# Patient Record
Sex: FEMALE | Race: WHITE | HISPANIC OR LATINO | Employment: OTHER | URBAN - METROPOLITAN AREA
[De-identification: names, ages, dates, MRNs, and addresses within clinical notes are randomized per-mention and may not be internally consistent; named-entity substitution may affect disease eponyms.]

---

## 2023-09-09 ENCOUNTER — HOSPITAL ENCOUNTER (INPATIENT)
Facility: MEDICAL CENTER | Age: 78
LOS: 2 days | DRG: 690 | End: 2023-09-11
Attending: EMERGENCY MEDICINE | Admitting: INTERNAL MEDICINE
Payer: MEDICARE

## 2023-09-09 ENCOUNTER — OFFICE VISIT (OUTPATIENT)
Dept: URGENT CARE | Facility: CLINIC | Age: 78
End: 2023-09-09
Payer: MEDICARE

## 2023-09-09 ENCOUNTER — APPOINTMENT (OUTPATIENT)
Dept: RADIOLOGY | Facility: MEDICAL CENTER | Age: 78
DRG: 690 | End: 2023-09-09
Attending: EMERGENCY MEDICINE
Payer: MEDICARE

## 2023-09-09 VITALS
WEIGHT: 107.5 LBS | SYSTOLIC BLOOD PRESSURE: 98 MMHG | RESPIRATION RATE: 16 BRPM | HEART RATE: 75 BPM | TEMPERATURE: 98.4 F | OXYGEN SATURATION: 96 % | HEIGHT: 59 IN | DIASTOLIC BLOOD PRESSURE: 54 MMHG | BODY MASS INDEX: 21.67 KG/M2

## 2023-09-09 DIAGNOSIS — R33.9 URINARY RETENTION: ICD-10-CM

## 2023-09-09 DIAGNOSIS — N39.0 ACUTE URINARY TRACT INFECTION: ICD-10-CM

## 2023-09-09 DIAGNOSIS — R10.30 LOWER ABDOMINAL PAIN: ICD-10-CM

## 2023-09-09 DIAGNOSIS — R33.9 INABILITY TO URINATE: ICD-10-CM

## 2023-09-09 DIAGNOSIS — E87.1 HYPONATREMIA: ICD-10-CM

## 2023-09-09 DIAGNOSIS — R11.0 NAUSEA: ICD-10-CM

## 2023-09-09 DIAGNOSIS — I95.9 HYPOTENSION, UNSPECIFIED HYPOTENSION TYPE: ICD-10-CM

## 2023-09-09 PROBLEM — Z87.19 HISTORY OF CIRRHOSIS OF LIVER: Status: ACTIVE | Noted: 2023-09-09

## 2023-09-09 PROBLEM — N12 PYELONEPHRITIS: Status: ACTIVE | Noted: 2023-09-09

## 2023-09-09 PROBLEM — R74.8 ELEVATED LIPASE: Status: ACTIVE | Noted: 2023-09-09

## 2023-09-09 PROBLEM — R33.8 ACUTE URINARY RETENTION: Status: ACTIVE | Noted: 2023-09-09

## 2023-09-09 PROBLEM — D50.9 IRON DEFICIENCY ANEMIA: Status: ACTIVE | Noted: 2023-09-09

## 2023-09-09 LAB
ALBUMIN SERPL BCP-MCNC: 4.2 G/DL (ref 3.2–4.9)
ALBUMIN/GLOB SERPL: 1.4 G/DL
ALP SERPL-CCNC: 92 U/L (ref 30–99)
ALT SERPL-CCNC: 25 U/L (ref 2–50)
ANION GAP SERPL CALC-SCNC: 11 MMOL/L (ref 7–16)
APPEARANCE UR: ABNORMAL
AST SERPL-CCNC: 25 U/L (ref 12–45)
BACTERIA #/AREA URNS HPF: ABNORMAL /HPF
BASOPHILS # BLD AUTO: 0 % (ref 0–1.8)
BASOPHILS # BLD: 0 K/UL (ref 0–0.12)
BILIRUB SERPL-MCNC: 0.5 MG/DL (ref 0.1–1.5)
BILIRUB UR QL STRIP.AUTO: NEGATIVE
BUN SERPL-MCNC: 16 MG/DL (ref 8–22)
CALCIUM ALBUM COR SERPL-MCNC: 9.3 MG/DL (ref 8.5–10.5)
CALCIUM SERPL-MCNC: 9.5 MG/DL (ref 8.5–10.5)
CHLORIDE SERPL-SCNC: 89 MMOL/L (ref 96–112)
CO2 SERPL-SCNC: 22 MMOL/L (ref 20–33)
COLOR UR: YELLOW
CREAT SERPL-MCNC: 0.89 MG/DL (ref 0.5–1.4)
EOSINOPHIL # BLD AUTO: 0.03 K/UL (ref 0–0.51)
EOSINOPHIL NFR BLD: 0.5 % (ref 0–6.9)
EPI CELLS #/AREA URNS HPF: ABNORMAL /HPF
ERYTHROCYTE [DISTWIDTH] IN BLOOD BY AUTOMATED COUNT: 39.9 FL (ref 35.9–50)
GFR SERPLBLD CREATININE-BSD FMLA CKD-EPI: 67 ML/MIN/1.73 M 2
GLOBULIN SER CALC-MCNC: 3.1 G/DL (ref 1.9–3.5)
GLUCOSE SERPL-MCNC: 97 MG/DL (ref 65–99)
GLUCOSE UR STRIP.AUTO-MCNC: NEGATIVE MG/DL
HCT VFR BLD AUTO: 30.6 % (ref 37–47)
HGB BLD-MCNC: 10.6 G/DL (ref 12–16)
IMM GRANULOCYTES # BLD AUTO: 0.01 K/UL (ref 0–0.11)
IMM GRANULOCYTES NFR BLD AUTO: 0.2 % (ref 0–0.9)
KETONES UR STRIP.AUTO-MCNC: NEGATIVE MG/DL
LEUKOCYTE ESTERASE UR QL STRIP.AUTO: ABNORMAL
LIPASE SERPL-CCNC: 138 U/L (ref 11–82)
LYMPHOCYTES # BLD AUTO: 0.48 K/UL (ref 1–4.8)
LYMPHOCYTES NFR BLD: 7.8 % (ref 22–41)
MCH RBC QN AUTO: 29.5 PG (ref 27–33)
MCHC RBC AUTO-ENTMCNC: 34.6 G/DL (ref 32.2–35.5)
MCV RBC AUTO: 85.2 FL (ref 81.4–97.8)
MICRO URNS: ABNORMAL
MONOCYTES # BLD AUTO: 0.87 K/UL (ref 0–0.85)
MONOCYTES NFR BLD AUTO: 14.1 % (ref 0–13.4)
NEUTROPHILS # BLD AUTO: 4.76 K/UL (ref 1.82–7.42)
NEUTROPHILS NFR BLD: 77.4 % (ref 44–72)
NITRITE UR QL STRIP.AUTO: POSITIVE
NRBC # BLD AUTO: 0 K/UL
NRBC BLD-RTO: 0 /100 WBC (ref 0–0.2)
OSMOLALITY SERPL: 261 MOSM/KG H2O (ref 278–298)
PH UR STRIP.AUTO: 6 [PH] (ref 5–8)
PLATELET # BLD AUTO: 160 K/UL (ref 164–446)
PMV BLD AUTO: 9.2 FL (ref 9–12.9)
POTASSIUM SERPL-SCNC: 4.4 MMOL/L (ref 3.6–5.5)
PROT SERPL-MCNC: 7.3 G/DL (ref 6–8.2)
PROT UR QL STRIP: NEGATIVE MG/DL
RBC # BLD AUTO: 3.59 M/UL (ref 4.2–5.4)
RBC # URNS HPF: ABNORMAL /HPF
RBC UR QL AUTO: NEGATIVE
RENAL EPI CELLS #/AREA URNS HPF: ABNORMAL /HPF
SODIUM SERPL-SCNC: 122 MMOL/L (ref 135–145)
SP GR UR STRIP.AUTO: 1.01
TSH SERPL DL<=0.005 MIU/L-ACNC: 1.48 UIU/ML (ref 0.38–5.33)
UROBILINOGEN UR STRIP.AUTO-MCNC: 1 MG/DL
WBC # BLD AUTO: 6.2 K/UL (ref 4.8–10.8)
WBC #/AREA URNS HPF: ABNORMAL /HPF

## 2023-09-09 PROCEDURE — 3078F DIAST BP <80 MM HG: CPT

## 2023-09-09 PROCEDURE — 80053 COMPREHEN METABOLIC PANEL: CPT

## 2023-09-09 PROCEDURE — 3074F SYST BP LT 130 MM HG: CPT

## 2023-09-09 PROCEDURE — 81001 URINALYSIS AUTO W/SCOPE: CPT

## 2023-09-09 PROCEDURE — 700111 HCHG RX REV CODE 636 W/ 250 OVERRIDE (IP): Mod: JZ | Performed by: EMERGENCY MEDICINE

## 2023-09-09 PROCEDURE — 83690 ASSAY OF LIPASE: CPT

## 2023-09-09 PROCEDURE — 74177 CT ABD & PELVIS W/CONTRAST: CPT

## 2023-09-09 PROCEDURE — 700102 HCHG RX REV CODE 250 W/ 637 OVERRIDE(OP): Performed by: INTERNAL MEDICINE

## 2023-09-09 PROCEDURE — 96374 THER/PROPH/DIAG INJ IV PUSH: CPT

## 2023-09-09 PROCEDURE — A9270 NON-COVERED ITEM OR SERVICE: HCPCS | Performed by: INTERNAL MEDICINE

## 2023-09-09 PROCEDURE — 99222 1ST HOSP IP/OBS MODERATE 55: CPT | Mod: AI | Performed by: INTERNAL MEDICINE

## 2023-09-09 PROCEDURE — 36415 COLL VENOUS BLD VENIPUNCTURE: CPT

## 2023-09-09 PROCEDURE — 700117 HCHG RX CONTRAST REV CODE 255: Performed by: EMERGENCY MEDICINE

## 2023-09-09 PROCEDURE — 700111 HCHG RX REV CODE 636 W/ 250 OVERRIDE (IP): Mod: JZ | Performed by: INTERNAL MEDICINE

## 2023-09-09 PROCEDURE — 99205 OFFICE O/P NEW HI 60 MIN: CPT

## 2023-09-09 PROCEDURE — 99285 EMERGENCY DEPT VISIT HI MDM: CPT

## 2023-09-09 PROCEDURE — 83930 ASSAY OF BLOOD OSMOLALITY: CPT

## 2023-09-09 PROCEDURE — 96375 TX/PRO/DX INJ NEW DRUG ADDON: CPT

## 2023-09-09 PROCEDURE — 700105 HCHG RX REV CODE 258: Performed by: EMERGENCY MEDICINE

## 2023-09-09 PROCEDURE — 770006 HCHG ROOM/CARE - MED/SURG/GYN SEMI*

## 2023-09-09 PROCEDURE — 85025 COMPLETE CBC W/AUTO DIFF WBC: CPT

## 2023-09-09 PROCEDURE — 84443 ASSAY THYROID STIM HORMONE: CPT

## 2023-09-09 PROCEDURE — 700105 HCHG RX REV CODE 258: Performed by: INTERNAL MEDICINE

## 2023-09-09 RX ORDER — OXYCODONE HYDROCHLORIDE 5 MG/1
5 TABLET ORAL
Status: DISCONTINUED | OUTPATIENT
Start: 2023-09-09 | End: 2023-09-11 | Stop reason: HOSPADM

## 2023-09-09 RX ORDER — OXYCODONE HYDROCHLORIDE 5 MG/1
2.5 TABLET ORAL
Status: DISCONTINUED | OUTPATIENT
Start: 2023-09-09 | End: 2023-09-11 | Stop reason: HOSPADM

## 2023-09-09 RX ORDER — LOSARTAN POTASSIUM 50 MG/1
TABLET ORAL
COMMUNITY
Start: 2023-08-19 | End: 2023-09-09

## 2023-09-09 RX ORDER — GABAPENTIN 300 MG/1
300 CAPSULE ORAL
Status: DISCONTINUED | OUTPATIENT
Start: 2023-09-09 | End: 2023-09-11 | Stop reason: HOSPADM

## 2023-09-09 RX ORDER — HYDRALAZINE HYDROCHLORIDE 50 MG/1
25 TABLET, FILM COATED ORAL 2 TIMES DAILY
Status: DISCONTINUED | OUTPATIENT
Start: 2023-09-09 | End: 2023-09-11 | Stop reason: HOSPADM

## 2023-09-09 RX ORDER — HYDRALAZINE HYDROCHLORIDE 25 MG/1
25 TABLET, FILM COATED ORAL 2 TIMES DAILY
COMMUNITY
Start: 2023-07-13 | End: 2023-09-09

## 2023-09-09 RX ORDER — IBUPROFEN 200 MG
400 TABLET ORAL EVERY 6 HOURS PRN
COMMUNITY

## 2023-09-09 RX ORDER — MORPHINE SULFATE 4 MG/ML
4 INJECTION INTRAVENOUS ONCE
Status: COMPLETED | OUTPATIENT
Start: 2023-09-09 | End: 2023-09-09

## 2023-09-09 RX ORDER — AMOXICILLIN 250 MG
2 CAPSULE ORAL 2 TIMES DAILY
Status: DISCONTINUED | OUTPATIENT
Start: 2023-09-09 | End: 2023-09-11 | Stop reason: HOSPADM

## 2023-09-09 RX ORDER — CEFTRIAXONE 1 G/1
1000 INJECTION, POWDER, FOR SOLUTION INTRAMUSCULAR; INTRAVENOUS ONCE
Status: COMPLETED | OUTPATIENT
Start: 2023-09-09 | End: 2023-09-09

## 2023-09-09 RX ORDER — HYDRALAZINE HYDROCHLORIDE 25 MG/1
25 TABLET, FILM COATED ORAL 2 TIMES DAILY
Status: ON HOLD | COMMUNITY
End: 2023-09-11

## 2023-09-09 RX ORDER — PREDNISONE 50 MG/1
TABLET ORAL
COMMUNITY
Start: 2023-07-27 | End: 2023-09-09

## 2023-09-09 RX ORDER — LOSARTAN POTASSIUM 50 MG/1
100 TABLET ORAL
Status: DISCONTINUED | OUTPATIENT
Start: 2023-09-10 | End: 2023-09-11 | Stop reason: HOSPADM

## 2023-09-09 RX ORDER — ENOXAPARIN SODIUM 100 MG/ML
40 INJECTION SUBCUTANEOUS DAILY
Status: DISCONTINUED | OUTPATIENT
Start: 2023-09-09 | End: 2023-09-11 | Stop reason: HOSPADM

## 2023-09-09 RX ORDER — HYDRALAZINE HYDROCHLORIDE 25 MG/1
25 TABLET, FILM COATED ORAL
COMMUNITY
End: 2023-09-09

## 2023-09-09 RX ORDER — PROPRANOLOL HYDROCHLORIDE 10 MG/1
10 TABLET ORAL 3 TIMES DAILY
COMMUNITY
Start: 2023-08-10 | End: 2023-09-09

## 2023-09-09 RX ORDER — HYDROMORPHONE HYDROCHLORIDE 1 MG/ML
0.25 INJECTION, SOLUTION INTRAMUSCULAR; INTRAVENOUS; SUBCUTANEOUS
Status: DISCONTINUED | OUTPATIENT
Start: 2023-09-09 | End: 2023-09-11 | Stop reason: HOSPADM

## 2023-09-09 RX ORDER — PROPRANOLOL HYDROCHLORIDE 10 MG/1
10 TABLET ORAL
COMMUNITY
End: 2023-09-09

## 2023-09-09 RX ORDER — ANTIOX #8/OM3/DHA/EPA/LUT/ZEAX 250-2.5 MG
CAPSULE ORAL
COMMUNITY
End: 2023-09-09

## 2023-09-09 RX ORDER — FERROUS SULFATE 324(65)MG
324 TABLET, DELAYED RELEASE (ENTERIC COATED) ORAL
COMMUNITY
End: 2023-09-09

## 2023-09-09 RX ORDER — GABAPENTIN 300 MG/1
300 CAPSULE ORAL
COMMUNITY

## 2023-09-09 RX ORDER — SPIRONOLACTONE 50 MG/1
50 TABLET, FILM COATED ORAL DAILY
Status: ON HOLD | COMMUNITY
End: 2023-09-11

## 2023-09-09 RX ORDER — SPIRONOLACTONE 50 MG/1
50 TABLET, FILM COATED ORAL DAILY
COMMUNITY
End: 2023-09-09

## 2023-09-09 RX ORDER — ONDANSETRON 2 MG/ML
4 INJECTION INTRAMUSCULAR; INTRAVENOUS EVERY 4 HOURS PRN
Status: DISCONTINUED | OUTPATIENT
Start: 2023-09-09 | End: 2023-09-11 | Stop reason: HOSPADM

## 2023-09-09 RX ORDER — TORSEMIDE 5 MG/1
5 TABLET ORAL DAILY
COMMUNITY
End: 2023-09-09

## 2023-09-09 RX ORDER — LOSARTAN POTASSIUM 100 MG/1
100 TABLET ORAL DAILY
COMMUNITY
End: 2023-09-09

## 2023-09-09 RX ORDER — GABAPENTIN 300 MG/1
300 CAPSULE ORAL
COMMUNITY
Start: 2023-08-22 | End: 2023-09-09

## 2023-09-09 RX ORDER — BISACODYL 10 MG
10 SUPPOSITORY, RECTAL RECTAL
Status: DISCONTINUED | OUTPATIENT
Start: 2023-09-09 | End: 2023-09-11 | Stop reason: HOSPADM

## 2023-09-09 RX ORDER — SODIUM CHLORIDE 9 MG/ML
500 INJECTION, SOLUTION INTRAVENOUS ONCE
Status: COMPLETED | OUTPATIENT
Start: 2023-09-09 | End: 2023-09-09

## 2023-09-09 RX ORDER — PROPRANOLOL HYDROCHLORIDE 10 MG/1
10 TABLET ORAL 3 TIMES DAILY
COMMUNITY

## 2023-09-09 RX ORDER — POLYETHYLENE GLYCOL 3350 17 G/17G
1 POWDER, FOR SOLUTION ORAL
Status: DISCONTINUED | OUTPATIENT
Start: 2023-09-09 | End: 2023-09-11 | Stop reason: HOSPADM

## 2023-09-09 RX ORDER — IBUPROFEN 200 MG
400 TABLET ORAL 4 TIMES DAILY PRN
Status: DISCONTINUED | OUTPATIENT
Start: 2023-09-09 | End: 2023-09-09

## 2023-09-09 RX ORDER — VALACYCLOVIR HYDROCHLORIDE 500 MG/1
TABLET, FILM COATED ORAL
COMMUNITY
Start: 2023-07-27 | End: 2023-09-09

## 2023-09-09 RX ORDER — FERROUS GLUCONATE 324(38)MG
TABLET ORAL
COMMUNITY
Start: 2023-08-19 | End: 2023-09-09

## 2023-09-09 RX ORDER — SODIUM CHLORIDE 9 MG/ML
INJECTION, SOLUTION INTRAVENOUS CONTINUOUS
Status: DISCONTINUED | OUTPATIENT
Start: 2023-09-09 | End: 2023-09-10

## 2023-09-09 RX ORDER — LOSARTAN POTASSIUM 100 MG/1
100 TABLET ORAL DAILY
Status: ON HOLD | COMMUNITY
End: 2023-09-11

## 2023-09-09 RX ORDER — TAMSULOSIN HYDROCHLORIDE 0.4 MG/1
0.4 CAPSULE ORAL
Status: DISCONTINUED | OUTPATIENT
Start: 2023-09-09 | End: 2023-09-11 | Stop reason: HOSPADM

## 2023-09-09 RX ORDER — TORSEMIDE 5 MG/1
TABLET ORAL
COMMUNITY
Start: 2023-07-13 | End: 2023-09-09

## 2023-09-09 RX ORDER — IBUPROFEN 400 MG/1
TABLET ORAL
COMMUNITY
Start: 2023-07-27

## 2023-09-09 RX ADMIN — HYDRALAZINE HYDROCHLORIDE 25 MG: 50 TABLET, FILM COATED ORAL at 23:22

## 2023-09-09 RX ADMIN — MORPHINE SULFATE 4 MG: 4 INJECTION, SOLUTION INTRAMUSCULAR; INTRAVENOUS at 19:43

## 2023-09-09 RX ADMIN — SODIUM CHLORIDE: 9 INJECTION, SOLUTION INTRAVENOUS at 22:26

## 2023-09-09 RX ADMIN — SODIUM CHLORIDE 500 ML: 9 INJECTION, SOLUTION INTRAVENOUS at 19:35

## 2023-09-09 RX ADMIN — CEFTRIAXONE SODIUM 1000 MG: 1 INJECTION, POWDER, FOR SOLUTION INTRAMUSCULAR; INTRAVENOUS at 19:42

## 2023-09-09 RX ADMIN — IOHEXOL 100 ML: 350 INJECTION, SOLUTION INTRAVENOUS at 20:00

## 2023-09-09 RX ADMIN — GABAPENTIN 300 MG: 300 CAPSULE ORAL at 23:22

## 2023-09-09 RX ADMIN — OXYCODONE HYDROCHLORIDE 2.5 MG: 5 TABLET ORAL at 22:01

## 2023-09-09 RX ADMIN — TAMSULOSIN HYDROCHLORIDE 0.4 MG: 0.4 CAPSULE ORAL at 23:22

## 2023-09-09 RX ADMIN — ENOXAPARIN SODIUM 40 MG: 100 INJECTION SUBCUTANEOUS at 23:21

## 2023-09-09 ASSESSMENT — PAIN DESCRIPTION - PAIN TYPE
TYPE: ACUTE PAIN

## 2023-09-09 ASSESSMENT — COGNITIVE AND FUNCTIONAL STATUS - GENERAL
SUGGESTED CMS G CODE MODIFIER MOBILITY: CH
MOBILITY SCORE: 24
DAILY ACTIVITIY SCORE: 24
SUGGESTED CMS G CODE MODIFIER DAILY ACTIVITY: CH

## 2023-09-09 ASSESSMENT — ENCOUNTER SYMPTOMS
NECK PAIN: 0
PHOTOPHOBIA: 0
PALPITATIONS: 0
CHILLS: 0
DOUBLE VISION: 0
NAUSEA: 1
CONSTIPATION: 0
VOMITING: 0
TREMORS: 0
WEIGHT LOSS: 0
BRUISES/BLEEDS EASILY: 0
SPUTUM PRODUCTION: 0
ORTHOPNEA: 0
COUGH: 0
FOCAL WEAKNESS: 0
SPEECH CHANGE: 0
HEARTBURN: 0
DIARRHEA: 0
BACK PAIN: 0
ABDOMINAL PAIN: 1
HEADACHES: 0
FLANK PAIN: 0
POLYDIPSIA: 0
HEMOPTYSIS: 0
BLURRED VISION: 0
FEVER: 0
NERVOUS/ANXIOUS: 0
HALLUCINATIONS: 0

## 2023-09-09 ASSESSMENT — LIFESTYLE VARIABLES
DOES PATIENT WANT TO STOP DRINKING: NO
TOTAL SCORE: 0
CONSUMPTION TOTAL: NEGATIVE
HAVE YOU EVER FELT YOU SHOULD CUT DOWN ON YOUR DRINKING: NO
EVER FELT BAD OR GUILTY ABOUT YOUR DRINKING: NO
TOTAL SCORE: 0
ALCOHOL_USE: YES
SUBSTANCE_ABUSE: 0
HOW MANY TIMES IN THE PAST YEAR HAVE YOU HAD 5 OR MORE DRINKS IN A DAY: 0
TOTAL SCORE: 0
ON A TYPICAL DAY WHEN YOU DRINK ALCOHOL HOW MANY DRINKS DO YOU HAVE: 1
EVER HAD A DRINK FIRST THING IN THE MORNING TO STEADY YOUR NERVES TO GET RID OF A HANGOVER: NO
HAVE PEOPLE ANNOYED YOU BY CRITICIZING YOUR DRINKING: NO
AVERAGE NUMBER OF DAYS PER WEEK YOU HAVE A DRINK CONTAINING ALCOHOL: 1

## 2023-09-09 ASSESSMENT — PATIENT HEALTH QUESTIONNAIRE - PHQ9
SUM OF ALL RESPONSES TO PHQ9 QUESTIONS 1 AND 2: 0
2. FEELING DOWN, DEPRESSED, IRRITABLE, OR HOPELESS: NOT AT ALL
1. LITTLE INTEREST OR PLEASURE IN DOING THINGS: NOT AT ALL

## 2023-09-09 NOTE — PROGRESS NOTES
"Chief Complaint   Patient presents with    Abdominal Pain     X3days Lower abdomen pain/pressure/dark urine/flank pain         Subjective:   HISTORY OF PRESENT ILLNESS: Patti Cali is a 78 y.o. female who presents for bilateral lower abd pain x 3 days. Painstarted in the RLQ and then moved to both sides by Friday.  She describes the pain as stabbing and constant.  Reports last normal BM was Thursday, states she is usually pretty regular.  She has had little to no appetite and has not been drinking water.  Reports  a watery BM this morning  Denies fevers, dysuria but reports her urine is dark, she does have some bilateral flank pain.  She had an EGD 2 days ago, reports she tolerated the procedure well, She has anemia secondary to chronic blood loss and alcoholic cirrhosis of the liver.      She has not voided since this morning and is unable to urinate in the clinic today      Medications, Allergies, current problem list, Social and Family history reviewed today in Epic.     Objective:     BP 98/54 (BP Location: Left arm, Patient Position: Sitting)   Pulse 75   Temp 36.9 °C (98.4 °F) (Temporal)   Resp 16   Ht 1.499 m (4' 11\")   Wt 48.8 kg (107 lb 8 oz)   SpO2 96%     Physical Exam  Vitals reviewed.   Constitutional:       Appearance: Normal appearance.   HENT:      Mouth/Throat:      Mouth: Mucous membranes are moist.   Cardiovascular:      Rate and Rhythm: Normal rate.      Heart sounds: Normal heart sounds.   Pulmonary:      Effort: Pulmonary effort is normal. No tachypnea.      Breath sounds: Normal breath sounds.   Abdominal:      General: Bowel sounds are normal. There is distension.      Tenderness: There is abdominal tenderness in the right lower quadrant and left lower quadrant. There is guarding.      Hernia: No hernia is present.      Comments: She is mildly distended in the lower abdomen and guarding on palpation.  TTP throughout lower abd    Neg Bond sign, no tenderness over Mcburney's " point    Suprapubic discomfort.  She has been unable to provide us with a urine sample.  She lasted voided early this morning.  Bowel sounds are normal.     No CVA tenderness   Skin:     General: Skin is warm and dry.   Neurological:      Mental Status: She is alert and oriented to person, place, and time.   Psychiatric:         Mood and Affect: Mood normal.          Assessment/Plan:     Diagnosis and associated orders    I personally reviewed prior external notes and test results pertinent to today's visit.     1. Lower abdominal pain        2. Inability to urinate        3. Nausea                  IMPRESSION: Patient is non-toxic appearing with lower abdominal pain.  She did just have a GI procedure and is unable to urinate.  She is distended and has guarding and pain on abd exam, for these reasons I feel she needs further imaging emergently or a catheter if she is unable to void.  I have instructed her to head to the ER for further evaluation.  Vital signs are stable and she is able to go by PV with her daughter    Educated on red flag symptoms and Instructed patient to return to Urgent Care or nearest Emergency Department if symptoms fail to improve, for any change in condition, further concerns, or new concerning symptoms. Patient states understanding of the plan of care and discharge instructions.  They are discharged in stable condition.         Please note that this dictation was created using voice recognition software. I have made a reasonable attempt to correct obvious errors, but I expect that there are errors of grammar and possibly content that I did not discover before finalizing the note.    This note was electronically signed by KARINE De Santiago

## 2023-09-09 NOTE — ED TRIAGE NOTES
Chief Complaint   Patient presents with    Abdominal Pain     R sided pain since Wednesday and L sided since Friday.      Pt ambulatory to triage for above complaint. Pt reports she has not been wanting to eat or drink since Wednesday. Pt reports urinating this morning and it was dark in color, denies burning sensation.

## 2023-09-10 PROBLEM — I95.9 HYPOTENSION: Status: ACTIVE | Noted: 2023-09-10

## 2023-09-10 PROBLEM — Z71.89 ACP (ADVANCE CARE PLANNING): Status: ACTIVE | Noted: 2023-09-10

## 2023-09-10 LAB
ALBUMIN SERPL BCP-MCNC: 3.3 G/DL (ref 3.2–4.9)
ALBUMIN/GLOB SERPL: 1.2 G/DL
ALP SERPL-CCNC: 72 U/L (ref 30–99)
ALT SERPL-CCNC: 17 U/L (ref 2–50)
ANION GAP SERPL CALC-SCNC: 10 MMOL/L (ref 7–16)
ANION GAP SERPL CALC-SCNC: 9 MMOL/L (ref 7–16)
AST SERPL-CCNC: 18 U/L (ref 12–45)
BASOPHILS # BLD AUTO: 0.2 % (ref 0–1.8)
BASOPHILS # BLD: 0.01 K/UL (ref 0–0.12)
BILIRUB SERPL-MCNC: 0.2 MG/DL (ref 0.1–1.5)
BUN SERPL-MCNC: 10 MG/DL (ref 8–22)
BUN SERPL-MCNC: 12 MG/DL (ref 8–22)
C DIFF DNA SPEC QL NAA+PROBE: NEGATIVE
C DIFF TOX GENS STL QL NAA+PROBE: NEGATIVE
CALCIUM ALBUM COR SERPL-MCNC: 9.2 MG/DL (ref 8.5–10.5)
CALCIUM SERPL-MCNC: 8.4 MG/DL (ref 8.5–10.5)
CALCIUM SERPL-MCNC: 8.6 MG/DL (ref 8.5–10.5)
CHLORIDE SERPL-SCNC: 98 MMOL/L (ref 96–112)
CHLORIDE SERPL-SCNC: 99 MMOL/L (ref 96–112)
CO2 SERPL-SCNC: 21 MMOL/L (ref 20–33)
CO2 SERPL-SCNC: 22 MMOL/L (ref 20–33)
CREAT SERPL-MCNC: 0.59 MG/DL (ref 0.5–1.4)
CREAT SERPL-MCNC: 0.64 MG/DL (ref 0.5–1.4)
EOSINOPHIL # BLD AUTO: 0.03 K/UL (ref 0–0.51)
EOSINOPHIL NFR BLD: 0.7 % (ref 0–6.9)
ERYTHROCYTE [DISTWIDTH] IN BLOOD BY AUTOMATED COUNT: 41.1 FL (ref 35.9–50)
GFR SERPLBLD CREATININE-BSD FMLA CKD-EPI: 92 ML/MIN/1.73 M 2
GFR SERPLBLD CREATININE-BSD FMLA CKD-EPI: 93 ML/MIN/1.73 M 2
GLOBULIN SER CALC-MCNC: 2.7 G/DL (ref 1.9–3.5)
GLUCOSE SERPL-MCNC: 87 MG/DL (ref 65–99)
GLUCOSE SERPL-MCNC: 87 MG/DL (ref 65–99)
HCT VFR BLD AUTO: 28 % (ref 37–47)
HGB BLD-MCNC: 9.3 G/DL (ref 12–16)
IMM GRANULOCYTES # BLD AUTO: 0.01 K/UL (ref 0–0.11)
IMM GRANULOCYTES NFR BLD AUTO: 0.2 % (ref 0–0.9)
LYMPHOCYTES # BLD AUTO: 0.66 K/UL (ref 1–4.8)
LYMPHOCYTES NFR BLD: 15.4 % (ref 22–41)
MAGNESIUM SERPL-MCNC: 1.9 MG/DL (ref 1.5–2.5)
MCH RBC QN AUTO: 29.2 PG (ref 27–33)
MCHC RBC AUTO-ENTMCNC: 33.2 G/DL (ref 32.2–35.5)
MCV RBC AUTO: 87.8 FL (ref 81.4–97.8)
MONOCYTES # BLD AUTO: 0.73 K/UL (ref 0–0.85)
MONOCYTES NFR BLD AUTO: 17 % (ref 0–13.4)
NEUTROPHILS # BLD AUTO: 2.85 K/UL (ref 1.82–7.42)
NEUTROPHILS NFR BLD: 66.5 % (ref 44–72)
NRBC # BLD AUTO: 0 K/UL
NRBC BLD-RTO: 0 /100 WBC (ref 0–0.2)
OSMOLALITY UR: 203 MOSM/KG H2O (ref 300–900)
PHOSPHATE SERPL-MCNC: 3 MG/DL (ref 2.5–4.5)
PLATELET # BLD AUTO: 136 K/UL (ref 164–446)
PMV BLD AUTO: 9.1 FL (ref 9–12.9)
POTASSIUM SERPL-SCNC: 3.8 MMOL/L (ref 3.6–5.5)
POTASSIUM SERPL-SCNC: 3.9 MMOL/L (ref 3.6–5.5)
PROT SERPL-MCNC: 6 G/DL (ref 6–8.2)
RBC # BLD AUTO: 3.19 M/UL (ref 4.2–5.4)
SODIUM SERPL-SCNC: 128 MMOL/L (ref 135–145)
SODIUM SERPL-SCNC: 129 MMOL/L (ref 135–145)
SODIUM SERPL-SCNC: 130 MMOL/L (ref 135–145)
SODIUM SERPL-SCNC: 131 MMOL/L (ref 135–145)
SODIUM UR-SCNC: <20 MMOL/L
WBC # BLD AUTO: 4.3 K/UL (ref 4.8–10.8)

## 2023-09-10 PROCEDURE — 36415 COLL VENOUS BLD VENIPUNCTURE: CPT

## 2023-09-10 PROCEDURE — 83735 ASSAY OF MAGNESIUM: CPT

## 2023-09-10 PROCEDURE — 700105 HCHG RX REV CODE 258: Performed by: STUDENT IN AN ORGANIZED HEALTH CARE EDUCATION/TRAINING PROGRAM

## 2023-09-10 PROCEDURE — 700102 HCHG RX REV CODE 250 W/ 637 OVERRIDE(OP): Performed by: INTERNAL MEDICINE

## 2023-09-10 PROCEDURE — 84100 ASSAY OF PHOSPHORUS: CPT

## 2023-09-10 PROCEDURE — 80053 COMPREHEN METABOLIC PANEL: CPT

## 2023-09-10 PROCEDURE — 83935 ASSAY OF URINE OSMOLALITY: CPT

## 2023-09-10 PROCEDURE — 770006 HCHG ROOM/CARE - MED/SURG/GYN SEMI*

## 2023-09-10 PROCEDURE — 87493 C DIFF AMPLIFIED PROBE: CPT

## 2023-09-10 PROCEDURE — 700101 HCHG RX REV CODE 250: Performed by: INTERNAL MEDICINE

## 2023-09-10 PROCEDURE — 84300 ASSAY OF URINE SODIUM: CPT

## 2023-09-10 PROCEDURE — A9270 NON-COVERED ITEM OR SERVICE: HCPCS | Performed by: STUDENT IN AN ORGANIZED HEALTH CARE EDUCATION/TRAINING PROGRAM

## 2023-09-10 PROCEDURE — 700111 HCHG RX REV CODE 636 W/ 250 OVERRIDE (IP): Performed by: INTERNAL MEDICINE

## 2023-09-10 PROCEDURE — 85025 COMPLETE CBC W/AUTO DIFF WBC: CPT

## 2023-09-10 PROCEDURE — 87086 URINE CULTURE/COLONY COUNT: CPT

## 2023-09-10 PROCEDURE — 700102 HCHG RX REV CODE 250 W/ 637 OVERRIDE(OP): Performed by: STUDENT IN AN ORGANIZED HEALTH CARE EDUCATION/TRAINING PROGRAM

## 2023-09-10 PROCEDURE — 99497 ADVNCD CARE PLAN 30 MIN: CPT | Performed by: STUDENT IN AN ORGANIZED HEALTH CARE EDUCATION/TRAINING PROGRAM

## 2023-09-10 PROCEDURE — A9270 NON-COVERED ITEM OR SERVICE: HCPCS | Performed by: INTERNAL MEDICINE

## 2023-09-10 PROCEDURE — 84295 ASSAY OF SERUM SODIUM: CPT

## 2023-09-10 PROCEDURE — 80048 BASIC METABOLIC PNL TOTAL CA: CPT

## 2023-09-10 PROCEDURE — 99232 SBSQ HOSP IP/OBS MODERATE 35: CPT | Mod: 25 | Performed by: STUDENT IN AN ORGANIZED HEALTH CARE EDUCATION/TRAINING PROGRAM

## 2023-09-10 RX ORDER — POTASSIUM CHLORIDE 20 MEQ/1
40 TABLET, EXTENDED RELEASE ORAL ONCE
Status: COMPLETED | OUTPATIENT
Start: 2023-09-10 | End: 2023-09-10

## 2023-09-10 RX ORDER — DEXTROSE MONOHYDRATE 50 MG/ML
INJECTION, SOLUTION INTRAVENOUS CONTINUOUS
Status: DISCONTINUED | OUTPATIENT
Start: 2023-09-10 | End: 2023-09-10

## 2023-09-10 RX ORDER — MIDODRINE HYDROCHLORIDE 5 MG/1
5 TABLET ORAL
Status: DISCONTINUED | OUTPATIENT
Start: 2023-09-10 | End: 2023-09-11 | Stop reason: HOSPADM

## 2023-09-10 RX ADMIN — GABAPENTIN 300 MG: 300 CAPSULE ORAL at 20:53

## 2023-09-10 RX ADMIN — CEFTRIAXONE SODIUM 1000 MG: 10 INJECTION, POWDER, FOR SOLUTION INTRAVENOUS at 20:53

## 2023-09-10 RX ADMIN — LOSARTAN POTASSIUM 100 MG: 50 TABLET, FILM COATED ORAL at 06:18

## 2023-09-10 RX ADMIN — HYDRALAZINE HYDROCHLORIDE 25 MG: 50 TABLET, FILM COATED ORAL at 06:18

## 2023-09-10 RX ADMIN — DEXTROSE MONOHYDRATE: 50 INJECTION, SOLUTION INTRAVENOUS at 08:23

## 2023-09-10 RX ADMIN — OXYCODONE HYDROCHLORIDE 5 MG: 5 TABLET ORAL at 17:24

## 2023-09-10 RX ADMIN — MIDODRINE HYDROCHLORIDE 5 MG: 5 TABLET ORAL at 09:16

## 2023-09-10 RX ADMIN — POTASSIUM CHLORIDE 40 MEQ: 1500 TABLET, EXTENDED RELEASE ORAL at 09:15

## 2023-09-10 RX ADMIN — MIDODRINE HYDROCHLORIDE 5 MG: 5 TABLET ORAL at 17:20

## 2023-09-10 RX ADMIN — MIDODRINE HYDROCHLORIDE 5 MG: 5 TABLET ORAL at 12:58

## 2023-09-10 RX ADMIN — ENOXAPARIN SODIUM 40 MG: 100 INJECTION SUBCUTANEOUS at 17:20

## 2023-09-10 ASSESSMENT — PAIN DESCRIPTION - PAIN TYPE
TYPE: ACUTE PAIN

## 2023-09-10 ASSESSMENT — PATIENT HEALTH QUESTIONNAIRE - PHQ9
2. FEELING DOWN, DEPRESSED, IRRITABLE, OR HOPELESS: NOT AT ALL
SUM OF ALL RESPONSES TO PHQ9 QUESTIONS 1 AND 2: 0
1. LITTLE INTEREST OR PLEASURE IN DOING THINGS: NOT AT ALL

## 2023-09-10 ASSESSMENT — PAIN SCALES - WONG BAKER: WONGBAKER_NUMERICALRESPONSE: HURTS A LITTLE MORE

## 2023-09-10 NOTE — ASSESSMENT & PLAN NOTE
Sodium 122  History of liver cirrhosis, as well as poor oral intake lately.  Appears normovolemic.  Hyponatremia could be observed in urine retention  Was given 500 cc of normal saline in ER  Plan: Work-up for hyponatremia with urine and serum osmolality, urine sodium, TSH, cortisol  Saline 50 cc/h    9/10 sodium overcorrected from 122 to 131 (9 mEq in 24 hours), I discontinued NS, started on D5W ,   monitor sodium every 4 hours.

## 2023-09-10 NOTE — PROGRESS NOTES
Hospital Medicine Daily Progress Note    Date of Service  9/10/2023    Chief Complaint  Patti Cali is a 78 y.o. female admitted 9/9/2023 with abdominal pain    Hospital Course  Patti Cali is a 78 y.o. female with past medical history of alcoholic liver cirrhosis, alcohol dependence in remission, iron deficiency anemia, who presented 9/9/2023 with complaints of lower abdominal pain. She was sent to ER by urgent care for urinary retention  CT of the abdomen and pelvis with contrast showed some cholelithiasis without any other abnormalities.  UA c/w UTI  Patient had endoscopy 2 days ago in [facility] for iron deficiency anemia that revealed angiodysplasia in the intestines, without active bleeding.  Her iron pills were increased  She has chronic right hip pain, lower back pain for which she was started on new pain medication 2 weeks ago, unable to identify. Denies drinking alcohol recently.       Interval Problem Update  Patient was sedated at the bedside    BP low this morning 87/41, I put losartan and hydralazine on hold.     Acute on chronic hyponatremia,  her sodium overcorrected from 122 to 131 (9 mEq in 24 hours), I discontinued NS, started on D5W , monitor sodium every 4 hours.     The history she told me seems inconsistent with the history from ER provider and admission provider. She told me she did not have dysuria or flank pain.  She only had lower abdominal pressure.  She told me she had watery diarrhea for past 2 days.  Per previous note, she did have dysuria, flank pain and no diarrhea.    I ordered C. Difficile  Continue Rocephin  I ordered urine culture    Addendum: Na 131> 128, I discontinued D5W    I have discussed this patient's plan of care and discharge plan at IDT rounds today with Case Management, Nursing, Nursing leadership, and other members of the IDT team.    Consultants/Specialty      Code Status  Full Code    Disposition  The patient is not medically cleared for discharge to  home or a post-acute facility.      I have placed the appropriate orders for post-discharge needs.    Review of Systems  All 12 systems were reviewed and negative except as mentioned above       Physical Exam  Temp:  [36.2 °C (97.2 °F)-36.6 °C (97.8 °F)] 36.2 °C (97.2 °F)  Pulse:  [69-91] 89  Resp:  [16-18] 18  BP: ()/(41-72) 90/46  SpO2:  [90 %-97 %] 90 %    Physical Exam  Constitutional:       Appearance: She is ill-appearing.   HENT:      Head: Normocephalic.      Mouth/Throat:      Mouth: Mucous membranes are moist.   Eyes:      Extraocular Movements: Extraocular movements intact.      Conjunctiva/sclera: Conjunctivae normal.   Cardiovascular:      Rate and Rhythm: Normal rate and regular rhythm.      Pulses: Normal pulses.      Heart sounds: Normal heart sounds.   Pulmonary:      Effort: Pulmonary effort is normal.      Breath sounds: Normal breath sounds.   Abdominal:      General: Bowel sounds are normal.      Palpations: Abdomen is soft.      Tenderness: There is abdominal tenderness. There is no guarding.   Musculoskeletal:         General: No swelling or tenderness.      Cervical back: Normal range of motion and neck supple.   Skin:     General: Skin is warm.   Neurological:      General: No focal deficit present.      Mental Status: She is alert and oriented to person, place, and time.   Psychiatric:         Mood and Affect: Mood normal.         Fluids    Intake/Output Summary (Last 24 hours) at 9/10/2023 1617  Last data filed at 9/10/2023 1040  Gross per 24 hour   Intake 120 ml   Output 850 ml   Net -730 ml       Laboratory  Recent Labs     09/09/23  1624 09/10/23  0227   WBC 6.2 4.3*   RBC 3.59* 3.19*   HEMOGLOBIN 10.6* 9.3*   HEMATOCRIT 30.6* 28.0*   MCV 85.2 87.8   MCH 29.5 29.2   MCHC 34.6 33.2   RDW 39.9 41.1   PLATELETCT 160* 136*   MPV 9.2 9.1     Recent Labs     09/09/23  1624 09/10/23  0227 09/10/23  0603 09/10/23  1024 09/10/23  1414   SODIUM 122* 128* 131* 130* 128*   POTASSIUM 4.4 3.9  3.8  --   --    CHLORIDE 89* 98 99  --   --    CO2 22 21 22  --   --    GLUCOSE 97 87 87  --   --    BUN 16 12 10  --   --    CREATININE 0.89 0.64 0.59  --   --    CALCIUM 9.5 8.6 8.4*  --   --                    Imaging  CT-ABDOMEN-PELVIS WITH   Final Result      1.  There is cholelithiasis.      2.  No other abnormalities are identified on CT abdomen pelvis.           Assessment/Plan  * Pyelonephritis- (present on admission)  Assessment & Plan  Presented with lower abdominal pain, flank discomfort, dysuria for 4 days, in the setting of urine retention  CT of the abdomen negative for acute findings  UA showed nitrates, leukocyte esterase, pyuria, bacteriuria  Plan: To trend with ceftriaxone until clinical improvement and discharged on oral medications to complete 10 to 12 days course.  Place Fox for urine retention    I ordered a urine culture  Continue Rocephin    Hypotension  Assessment & Plan  BP low this morning 87/41   I put losartan and hydralazine on hold.     History of alcohol cirrhosis  Hold NS given overcorrected hyponatremia  Started on midodrine  Monitor    Hyponatremia  Assessment & Plan  Sodium 122  History of liver cirrhosis, as well as poor oral intake lately.  Appears normovolemic.  Hyponatremia could be observed in urine retention  Was given 500 cc of normal saline in ER  Plan: Work-up for hyponatremia with urine and serum osmolality, urine sodium, TSH, cortisol  Saline 50 cc/h    9/10 sodium overcorrected from 122 to 131 (9 mEq in 24 hours), I discontinued NS, started on D5W ,   monitor sodium every 4 hours.     Acute urinary retention  Assessment & Plan  We will place Fox and start on tamsulosin  Consider voiding trial before discharge versus discharge with Fox with outpatient follow-up with urology    ACP (advance care planning)  Assessment & Plan  I discussed advance care planning with the patient and daughters at bedside, including diagnosis, prognosis, plan of care, risks and benefits  of any therapies that could be offered.   We discussed regarding CODE STATUS, CPR and intubation with mechanical ventilation, discussed regarding risk and benefits. The patient is willing to keep full code.  Patient wants to be fully resuscitated if there is a chance to bring her back. However, she would not want to stay on machine for long time.  Continue full code.  ACP: 16min        Elevated lipase  Assessment & Plan  Lipase 138.  CT of the abdomen is without acute abnormalities.  There is cholelithiasis, but patient denies any right upper or left upper abdominal pain and on exam there is no tenderness in the upper abdomen.  Possibly secondary to chronic pancreatitis    Iron deficiency anemia  Assessment & Plan  Probably chronic blood loss anemia.  Recent endoscopy revealed intestinal angiodysplasia.  Denies melena or blood in stool today.  Continue iron pills      History of cirrhosis of liver  Assessment & Plan  Follow-up with GI         VTE prophylaxis: lovenox      I have performed a physical exam and reviewed and updated ROS and Plan today (9/10/2023). In review of yesterday's note (9/9/2023), there are no changes except as documented above.

## 2023-09-10 NOTE — ASSESSMENT & PLAN NOTE
We will place Fox and start on tamsulosin  Consider voiding trial before discharge versus discharge with Fox with outpatient follow-up with urology

## 2023-09-10 NOTE — ASSESSMENT & PLAN NOTE
Probably chronic blood loss anemia.  Recent endoscopy revealed intestinal angiodysplasia.  Denies melena or blood in stool today.  Continue iron pills

## 2023-09-10 NOTE — ASSESSMENT & PLAN NOTE
BP low this morning 87/41   I put losartan and hydralazine on hold.     History of alcohol cirrhosis  Hold NS given overcorrected hyponatremia  Started on midodrine  Monitor

## 2023-09-10 NOTE — H&P
Hospital Medicine History & Physical Note    Date of Service  9/9/2023    Primary Care Physician  Pcp Pt States None        Code Status  Full Code    Chief Complaint  Chief Complaint   Patient presents with    Abdominal Pain     R sided pain since Wednesday and L sided since Friday.        History of Presenting Illness  Patti Cali is a 78 y.o. female with past medical history of alcoholic liver cirrhosis, alcohol dependence in remission, iron deficiency anemia, who presented 9/9/2023 with complaints of lower abdominal pain in the right lower quadrant, left lower quadrant and suprapubic area, dull constant without elevating aggravating factors for 3 days, poor appetite, occasional nausea, bladder discomfort with urination.  She denies diarrhea, fever, cough, dizziness.  She was seen at urgent care and was sent to ER, after she was diagnosed with urinary retention  CT of the abdomen and pelvis with contrast showed some cholelithiasis without any other abnormalities.  Blood work showed sodium 122, chloride 89, lipase 178.  UA showed cloudy urine, positive for nitrates, large leukocyte esterase, WBC 20-50, many bacteria.  Patient had endoscopy 2 days ago in [facility] for iron deficiency anemia that revealed angiodysplasia in the intestines, without active bleeding.  Her iron pills were increased  She has chronic right hip pain, lower back pain for which she was started on new pain medication 2 weeks ago, unable to identify.  Appetite has been poor.  Denies drinking alcohol recently.    I discussed the plan of care with patient and family, ERP .    Review of Systems  Review of Systems   Constitutional:  Negative for chills, fever and weight loss.   HENT:  Negative for ear pain, hearing loss and tinnitus.    Eyes:  Negative for blurred vision, double vision and photophobia.   Respiratory:  Negative for cough, hemoptysis and sputum production.    Cardiovascular:  Negative for chest pain, palpitations and  orthopnea.   Gastrointestinal:  Positive for abdominal pain and nausea. Negative for constipation, diarrhea, heartburn and vomiting.   Genitourinary:  Positive for dysuria. Negative for flank pain, frequency and hematuria.   Musculoskeletal:  Negative for back pain, joint pain and neck pain.   Skin:  Negative for itching and rash.   Neurological:  Negative for tremors, speech change, focal weakness and headaches.   Endo/Heme/Allergies:  Negative for environmental allergies and polydipsia. Does not bruise/bleed easily.   Psychiatric/Behavioral:  Negative for hallucinations and substance abuse. The patient is not nervous/anxious.        Past Medical History   has no past medical history on file.    Surgical History   has no past surgical history on file.     Family History  family history is not on file.   Family history reviewed with patient. There is no family history that is pertinent to the chief complaint.     Social History   reports that she has never smoked. She has never used smokeless tobacco. She reports current alcohol use. She reports that she does not use drugs.    Allergies  Allergies   Allergen Reactions    Latex     Denture Adhesive Rash       Medications  Prior to Admission Medications   Prescriptions Last Dose Informant Patient Reported? Taking?   Multiple Vitamins-Minerals (PRESERVISION AREDS 2) Cap   Yes No   Sig: Take  by mouth.   ferrous gluconate (FERGON) 324 (38 Fe) MG Tab   Yes No   Sig: TAKE 1 TABLET BY MOUTH ONCE EVERY DAY WITH WATER OR JUICE BETWEEN MEALS   ferrous sulfate 324 (65 Fe) MG Tablet Delayed Response EC tablet   Yes No   Sig: Take 324 mg by mouth.   gabapentin (NEURONTIN) 300 MG Cap   Yes No   Sig: Take 300 mg by mouth every day. for 30 days   hydrALAZINE (APRESOLINE) 25 MG Tab   Yes No   Sig: Take 25 mg by mouth 2 times a day.   hydrALAZINE (APRESOLINE) 25 MG Tab   Yes No   Sig: Take 25 mg by mouth.   ibuprofen (MOTRIN) 400 MG Tab   Yes No   Sig: TAKE 1 TABLET BY MOUTH EVERY 6  HOURS AS NEEDED FOR PAIN OR INFLAMMATION   losartan (COZAAR) 100 MG Tab   Yes No   Sig: Take 100 mg by mouth every day.   losartan (COZAAR) 50 MG Tab   Yes No   Sig: TAKE 1 TABLET BY MOUTH EVERY DAY FOR 90 DAYS   predniSONE (DELTASONE) 50 MG Tab   Yes No   Sig: TAKE 1 TABLET BY MOUTH EVERY MORNING FOR SHINGLES   propranolol (INDERAL) 10 MG Tab   Yes No   Sig: Take 10 mg by mouth 3 times a day.   propranolol (INDERAL) 10 MG Tab   Yes No   Sig: Take 10 mg by mouth.   spironolactone (ALDACTONE) 50 MG Tab   Yes No   Sig: Take 50 mg by mouth every day.   torsemide (DEMADEX) 5 MG Tab   Yes No   Sig: TAKE 1 TABLET BY MOUTH EVERY OTHER DAY *MAX 90 DAYS PER INS   torsemide (DEMADEX) 5 MG Tab   Yes No   Sig: Take 5 mg by mouth every day.   valACYclovir (VALTREX) 500 MG Tab   Yes No   Sig: TAKE 2 TABLETS BY MOUTH 3 TIMES A DAY FOR SHINGLES      Facility-Administered Medications: None       Physical Exam  Temp:  [36.2 °C (97.2 °F)-36.9 °C (98.4 °F)] 36.2 °C (97.2 °F)  Pulse:  [69-81] 75  Resp:  [16-18] 16  BP: ()/(54-72) 149/72  SpO2:  [91 %-98 %] 94 %  Blood Pressure : (!) 149/72   Temperature: 36.2 °C (97.2 °F)   Pulse: 75   Respiration: 16   Pulse Oximetry: 94 %       Physical Exam  Vitals and nursing note reviewed.   Constitutional:       General: She is not in acute distress.     Appearance: Normal appearance.   HENT:      Head: Normocephalic and atraumatic.      Nose: Nose normal.      Mouth/Throat:      Mouth: Mucous membranes are moist.   Eyes:      Extraocular Movements: Extraocular movements intact.      Pupils: Pupils are equal, round, and reactive to light.   Cardiovascular:      Rate and Rhythm: Normal rate and regular rhythm.   Pulmonary:      Effort: Pulmonary effort is normal.      Breath sounds: Normal breath sounds.   Abdominal:      General: Abdomen is flat. There is no distension.      Tenderness: There is abdominal tenderness in the right lower quadrant, suprapubic area and left lower quadrant. There  "is right CVA tenderness and left CVA tenderness. There is no guarding or rebound.   Musculoskeletal:         General: No swelling or deformity. Normal range of motion.      Cervical back: Normal range of motion and neck supple.   Skin:     General: Skin is warm and dry.   Neurological:      General: No focal deficit present.      Mental Status: She is alert and oriented to person, place, and time.   Psychiatric:         Mood and Affect: Mood normal.         Behavior: Behavior normal.         Laboratory:  Recent Labs     09/09/23  1624   WBC 6.2   RBC 3.59*   HEMOGLOBIN 10.6*   HEMATOCRIT 30.6*   MCV 85.2   MCH 29.5   MCHC 34.6   RDW 39.9   PLATELETCT 160*   MPV 9.2     Recent Labs     09/09/23  1624   SODIUM 122*   POTASSIUM 4.4   CHLORIDE 89*   CO2 22   GLUCOSE 97   BUN 16   CREATININE 0.89   CALCIUM 9.5     Recent Labs     09/09/23  1624   ALTSGPT 25   ASTSGOT 25   ALKPHOSPHAT 92   TBILIRUBIN 0.5   LIPASE 138*   GLUCOSE 97         No results for input(s): \"NTPROBNP\" in the last 72 hours.      No results for input(s): \"TROPONINT\" in the last 72 hours.    Imaging:  CT-ABDOMEN-PELVIS WITH   Final Result      1.  There is cholelithiasis.      2.  No other abnormalities are identified on CT abdomen pelvis.              Assessment/Plan:  Justification for Admission Status  I anticipate this patient will require at least two midnights for appropriate medical management, necessitating inpatient admission because pyelonephritis, hyponatremia    Patient will need a Med/Surg bed on MEDICAL service .  The need is secondary to pyelonephritis    * Pyelonephritis- (present on admission)  Assessment & Plan  Presented with lower abdominal pain, flank discomfort, dysuria for 4 days, in the setting of urine retention  CT of the abdomen negative for acute findings  UA showed nitrates, leukocyte esterase, pyuria, bacteriuria  Plan: To trend with ceftriaxone until clinical improvement and discharged on oral medications to complete 10 " to 12 days course.  Place Fox for urine retention    Elevated lipase  Assessment & Plan  Lipase 138.  CT of the abdomen is without acute abnormalities.  There is cholelithiasis, but patient denies any right upper or left upper abdominal pain and on exam there is no tenderness in the upper abdomen.  Possibly secondary to chronic pancreatitis    Iron deficiency anemia  Assessment & Plan  Probably chronic blood loss anemia.  Recent endoscopy revealed intestinal angiodysplasia.  Denies melena or blood in stool today.  Continue iron pills      History of cirrhosis of liver  Assessment & Plan  Follow-up with GI    Hyponatremia  Assessment & Plan  Sodium 122  History of liver cirrhosis, as well as poor oral intake lately.  Appears normovolemic.  Hyponatremia could be observed in urine retention  Was given 500 cc of normal saline in ER  Plan: Work-up for hyponatremia with urine and serum osmolality, urine sodium, TSH, cortisol  Saline 50 cc/h  Repeat BMP every 4 hours and avoid overcorrection    Acute urinary retention  Assessment & Plan  We will place Fox and start on tamsulosin  Consider voiding trial before discharge versus discharge with Fox with outpatient follow-up with urology        VTE prophylaxis: enoxaparin ppx

## 2023-09-10 NOTE — ASSESSMENT & PLAN NOTE
Presented with lower abdominal pain, flank discomfort, dysuria for 4 days, in the setting of urine retention  CT of the abdomen negative for acute findings  UA showed nitrates, leukocyte esterase, pyuria, bacteriuria  Plan: To trend with ceftriaxone until clinical improvement and discharged on oral medications to complete 10 to 12 days course.  Place Fox for urine retention    I ordered a urine culture  Continue Rocephin

## 2023-09-10 NOTE — ED PROVIDER NOTES
ED Provider Note    CHIEF COMPLAINT  Chief Complaint   Patient presents with    Abdominal Pain     R sided pain since Wednesday and L sided since Friday.        EXTERNAL RECORDS REVIEWED  Urgent care note from prior to arrival, lower abdominal pain, distention and guarding on exam    HPI/XIMENA    Patti Cali is a 78 y.o. female who presents for evaluation of lower abdominal pain.  Started 3 to 4 days ago, initially was right-sided, now bilateral.  Status post appendectomy.  It is sharp stabbing constant pain.  Normal bowel movements without diarrhea.  Decreased appetite and reports that she also has had a decreased water intake.  Dark urine, bilateral flank pain but no fever.  2 days ago she underwent EGD, had no complications related to that procedure.  History of alcoholic cirrhosis but has no significant alcohol intake currently.  Apparently was unable to urinate at the urgent care today.    PAST MEDICAL HISTORY       SURGICAL HISTORY  patient denies any surgical history    FAMILY HISTORY  History reviewed. No pertinent family history.    SOCIAL HISTORY  Social History     Tobacco Use    Smoking status: Never    Smokeless tobacco: Never   Substance and Sexual Activity    Alcohol use: Yes     Comment: rarely    Drug use: Never    Sexual activity: Not on file       CURRENT MEDICATIONS  Home Medications       Reviewed by Mary Randall R.N. (Registered Nurse) on 09/09/23 at 1617  Med List Status: Not Addressed     Medication Last Dose Status   ferrous gluconate (FERGON) 324 (38 Fe) MG Tab  Active   ferrous sulfate 324 (65 Fe) MG Tablet Delayed Response EC tablet  Active   gabapentin (NEURONTIN) 300 MG Cap  Active   hydrALAZINE (APRESOLINE) 25 MG Tab  Active   hydrALAZINE (APRESOLINE) 25 MG Tab  Active   ibuprofen (MOTRIN) 400 MG Tab  Active   losartan (COZAAR) 100 MG Tab  Active   losartan (COZAAR) 50 MG Tab  Active   Multiple Vitamins-Minerals (PRESERVISION AREDS 2) Cap  Active   predniSONE (DELTASONE) 50 MG Tab   "Active   propranolol (INDERAL) 10 MG Tab  Active   propranolol (INDERAL) 10 MG Tab  Active   spironolactone (ALDACTONE) 50 MG Tab  Active   torsemide (DEMADEX) 5 MG Tab  Active   torsemide (DEMADEX) 5 MG Tab  Active   valACYclovir (VALTREX) 500 MG Tab  Active                    ALLERGIES  Allergies   Allergen Reactions    Latex     Denture Adhesive Rash       PHYSICAL EXAM  VITAL SIGNS: /58   Pulse 69   Temp 36.2 °C (97.2 °F) (Temporal)   Resp 18   Ht 1.499 m (4' 11\")   Wt 49.3 kg (108 lb 11 oz)   SpO2 91%   BMI 21.95 kg/m²    Constitutional: Well appearing patient in no acute distress.  Awake and alert, not toxic nor ill in appearance.  HENT: Normocephalic, no obvious evidence of acute trauma.  Eyes: No scleral icterus. Normal conjunctiva   Thorax & Lungs: Normal nonlabored respirations. I appreciate no wheezing, rhonchi or rales. There is normal air movement.  Upon cardiac ascultation I appreciate a regular heart rhythm and a normal rate.   Abdomen: No obvious distention on inspection.  She does have moderately severe lower abdominal tenderness without focality, no rebound or guarding however.  The upper portions of the abdomen are nontender.  Skin: The exposed portions of skin reveal no obvious rash or other abnormalities.  Extremities/Musculoskeletal: No obvious sign of acute trauma. No asymmetric calf tenderness or edema.   Neurologic: Alert & oriented. No focal deficits observed.      DIAGNOSTIC STUDIES / PROCEDURES    LABS  Results for orders placed or performed during the hospital encounter of 09/09/23   CBC with Differential   Result Value Ref Range    WBC 6.2 4.8 - 10.8 K/uL    RBC 3.59 (L) 4.20 - 5.40 M/uL    Hemoglobin 10.6 (L) 12.0 - 16.0 g/dL    Hematocrit 30.6 (L) 37.0 - 47.0 %    MCV 85.2 81.4 - 97.8 fL    MCH 29.5 27.0 - 33.0 pg    MCHC 34.6 32.2 - 35.5 g/dL    RDW 39.9 35.9 - 50.0 fL    Platelet Count 160 (L) 164 - 446 K/uL    MPV 9.2 9.0 - 12.9 fL    Neutrophils-Polys 77.40 (H) 44.00 " - 72.00 %    Lymphocytes 7.80 (L) 22.00 - 41.00 %    Monocytes 14.10 (H) 0.00 - 13.40 %    Eosinophils 0.50 0.00 - 6.90 %    Basophils 0.00 0.00 - 1.80 %    Immature Granulocytes 0.20 0.00 - 0.90 %    Nucleated RBC 0.00 0.00 - 0.20 /100 WBC    Neutrophils (Absolute) 4.76 1.82 - 7.42 K/uL    Lymphs (Absolute) 0.48 (L) 1.00 - 4.80 K/uL    Monos (Absolute) 0.87 (H) 0.00 - 0.85 K/uL    Eos (Absolute) 0.03 0.00 - 0.51 K/uL    Baso (Absolute) 0.00 0.00 - 0.12 K/uL    Immature Granulocytes (abs) 0.01 0.00 - 0.11 K/uL    NRBC (Absolute) 0.00 K/uL   Complete Metabolic Panel   Result Value Ref Range    Sodium 122 (L) 135 - 145 mmol/L    Potassium 4.4 3.6 - 5.5 mmol/L    Chloride 89 (L) 96 - 112 mmol/L    Co2 22 20 - 33 mmol/L    Anion Gap 11.0 7.0 - 16.0    Glucose 97 65 - 99 mg/dL    Bun 16 8 - 22 mg/dL    Creatinine 0.89 0.50 - 1.40 mg/dL    Calcium 9.5 8.5 - 10.5 mg/dL    Correct Calcium 9.3 8.5 - 10.5 mg/dL    AST(SGOT) 25 12 - 45 U/L    ALT(SGPT) 25 2 - 50 U/L    Alkaline Phosphatase 92 30 - 99 U/L    Total Bilirubin 0.5 0.1 - 1.5 mg/dL    Albumin 4.2 3.2 - 4.9 g/dL    Total Protein 7.3 6.0 - 8.2 g/dL    Globulin 3.1 1.9 - 3.5 g/dL    A-G Ratio 1.4 g/dL   Lipase   Result Value Ref Range    Lipase 138 (H) 11 - 82 U/L   Urinalysis    Specimen: Urine   Result Value Ref Range    Color Yellow     Character Cloudy (A)     Specific Gravity 1.007 <1.035    Ph 6.0 5.0 - 8.0    Glucose Negative Negative mg/dL    Ketones Negative Negative mg/dL    Protein Negative Negative mg/dL    Bilirubin Negative Negative    Urobilinogen, Urine 1.0 Negative    Nitrite Positive (A) Negative    Leukocyte Esterase Large (A) Negative    Occult Blood Negative Negative    Micro Urine Req Microscopic    ESTIMATED GFR   Result Value Ref Range    GFR (CKD-EPI) 67 >60 mL/min/1.73 m 2   URINE MICROSCOPIC (W/UA)   Result Value Ref Range    WBC 20-50 (A) /hpf    RBC 0-2 /hpf    Bacteria Many (A) None /hpf    Epithelial Cells Few /hpf    Epithelial Cells  Renal Few /hpf        RADIOLOGY  I have independently interpreted the diagnostic imaging associated with this visit and am waiting the final reading from the radiologist.   My preliminary interpretation is as follows: No diverticulitis  Radiologist interpretation:   CT-ABDOMEN-PELVIS WITH   Final Result      1.  There is cholelithiasis.      2.  No other abnormalities are identified on CT abdomen pelvis.            COURSE & MEDICAL DECISION MAKING    ED Observation Status? Yes; I am placing the patient in to an observation status due to a diagnostic uncertainty as well as therapeutic intensity. Patient placed in observation status at 5:30 PM, 9/9/2023.     Observation plan is as follows: Complete work-up including CT scan, antibiotics, reevaluation for disposition decision    Upon Reevaluation, the patient's condition has: not improved; and will be escalated to hospitalization.    Patient discharged from ED Observation status at 8:50 PM (Time) 9/9/2023 (Date).     INITIAL ASSESSMENT, COURSE AND PLAN  Care Narrative: This is a 78-year-old lady who is sent here from urgent care for evaluation of lower abdominal pain for the past several days, dark urine, no fever.  History of ureterolithiasis.  Pain initially was right lower quadrant but now is across the entire lower abdomen.  Status post appendectomy.  Exam reveals tenderness across the lower abdomen, no evidence of peritonitis however.  Her triage ordered blood work reveals a normal WBC, anemia with hemoglobin of 10.6 which sounds as though it is chronic although there is no old labs for comparison.  Of greater concern however is hyponatremia with a sodium of 122, again no old labs for comparison although the patient does reports she gets labs drawn every 3 months at her regular hospital in [place].  We will work on getting records.  Additionally urinalysis is nitrite positive with a microscopy consistent with a urinary tract infection.  She will be treated with  ceftriaxone.  Regarding her tenderness I do think the tenderness in her abdomen is more than I would expect for simple cystitis so a CT scan will be obtained to help evaluate for more severe pathology such as diverticulitis.  She will be treated with morphine.  She did receive 500 mL of normal saline.  She will be    CT scan does not reveal any indication of acute diverticulitis or other acute inflammatory process within the abdomen pelvis.  She does have quite a distended bladder on the CT scan and a mini cath will be of performed to help decompress the bladder.  She already received ceftriaxone.  Half a liter of IV fluids.  Given her hyponatremia the patient will require admission for further evaluation and care    DISPOSITION AND DISCUSSIONS  I have discussed management of the patient with the following physicians and SID's: Hospitalist Dr. HER      FINAL DIAGNOSIS  1. Hyponatremia    2. Acute urinary tract infection    3. Urinary retention           Electronically signed by: Itz Wilkes M.D., 9/9/2023 7:49 PM

## 2023-09-10 NOTE — CARE PLAN
The patient is Stable - Low risk of patient condition declining or worsening    Shift Goals  Clinical Goals: Antibiotic, monitor intake and output    Progress made toward(s) clinical / shift goals:    Plan of care discussed with the patient and family; IVF's started; forrest catheter placed, pt states pain to lower abdomen is much better after forrest catheter was inserted. Urine is clear yellow.  Patient is not progressing towards the following goals:      Problem: Knowledge Deficit - Standard  Goal: Patient and family/care givers will demonstrate understanding of plan of care, disease process/condition, diagnostic tests and medications  Outcome: Not Progressing

## 2023-09-10 NOTE — ED NOTES
Bedside report received from Sapphire HERNANDEZ   Assumed patient care. Verified patient identification.  Checked on bed, connected to monitor,  with unlabored respirations. Discussed plan of care.   Vital signs is stable.  Gurney in low position, side rail up for pt safety. Call light within reach.   No needs identified at the moment. Instructed to use call light when needed.    Alert and Oriented: 4  Ambulatory: Yes  Oxygen: RA

## 2023-09-10 NOTE — ASSESSMENT & PLAN NOTE
Lipase 138.  CT of the abdomen is without acute abnormalities.  There is cholelithiasis, but patient denies any right upper or left upper abdominal pain and on exam there is no tenderness in the upper abdomen.  Possibly secondary to chronic pancreatitis

## 2023-09-10 NOTE — ASSESSMENT & PLAN NOTE
I discussed advance care planning with the patient and daughters at bedside, including diagnosis, prognosis, plan of care, risks and benefits of any therapies that could be offered.   We discussed regarding CODE STATUS, CPR and intubation with mechanical ventilation, discussed regarding risk and benefits. The patient is willing to keep full code.  Patient wants to be fully resuscitated if there is a chance to bring her back. However, she would not want to stay on machine for long time.  Continue full code.  ACP: 16min

## 2023-09-10 NOTE — ED NOTES
Spoke with Natalia from [facility]. This RN to call back, RN assigned is with another patient, RN to call in 5 mins. Transport aware.

## 2023-09-10 NOTE — ED NOTES
Bedside report to MARY Morris. Bed in lowest locked position and call light in reach. Pt on room air and not on cardiac monitoring.

## 2023-09-10 NOTE — PROGRESS NOTES
4 Eyes Skin Assessment Completed by MARY Dove and MARY Lunsford.    Head WDL  Ears WDL  Nose WDL  Mouth WDL  Neck WDL  Breast/Chest WDL  Shoulder Blades WDL  Spine WDL  (R) Arm/Elbow/Hand WDL  (L) Arm/Elbow/Hand WDL  Abdomen WDL  Groin WDL  Scrotum/Coccyx/Buttocks WDL  (R) Leg Scar  (L) Leg Scab  (R) Heel/Foot/Toe WDL  (L) Heel/Foot/Toe WDL          Devices In Places Blood Pressure Cuff      Interventions In Place Pillows and Pressure Redistribution Mattress    Possible Skin Injury No    Pictures Uploaded Into Epic N/A  Wound Consult Placed N/A  RN Wound Prevention Protocol Ordered No

## 2023-09-11 VITALS
DIASTOLIC BLOOD PRESSURE: 56 MMHG | OXYGEN SATURATION: 92 % | HEIGHT: 59 IN | BODY MASS INDEX: 21.91 KG/M2 | TEMPERATURE: 98.2 F | WEIGHT: 108.69 LBS | RESPIRATION RATE: 14 BRPM | HEART RATE: 84 BPM | SYSTOLIC BLOOD PRESSURE: 106 MMHG

## 2023-09-11 LAB
AMMONIA PLAS-SCNC: 23 UMOL/L (ref 11–45)
ANION GAP SERPL CALC-SCNC: 8 MMOL/L (ref 7–16)
BUN SERPL-MCNC: 8 MG/DL (ref 8–22)
CALCIUM SERPL-MCNC: 8.5 MG/DL (ref 8.5–10.5)
CHLORIDE SERPL-SCNC: 99 MMOL/L (ref 96–112)
CO2 SERPL-SCNC: 23 MMOL/L (ref 20–33)
CREAT SERPL-MCNC: 0.69 MG/DL (ref 0.5–1.4)
ERYTHROCYTE [DISTWIDTH] IN BLOOD BY AUTOMATED COUNT: 41.3 FL (ref 35.9–50)
GFR SERPLBLD CREATININE-BSD FMLA CKD-EPI: 90 ML/MIN/1.73 M 2
GLUCOSE SERPL-MCNC: 101 MG/DL (ref 65–99)
HCT VFR BLD AUTO: 27.7 % (ref 37–47)
HGB BLD-MCNC: 9.2 G/DL (ref 12–16)
MCH RBC QN AUTO: 28.8 PG (ref 27–33)
MCHC RBC AUTO-ENTMCNC: 33.2 G/DL (ref 32.2–35.5)
MCV RBC AUTO: 86.8 FL (ref 81.4–97.8)
PLATELET # BLD AUTO: 165 K/UL (ref 164–446)
PMV BLD AUTO: 8.9 FL (ref 9–12.9)
POTASSIUM SERPL-SCNC: 4.5 MMOL/L (ref 3.6–5.5)
RBC # BLD AUTO: 3.19 M/UL (ref 4.2–5.4)
SODIUM SERPL-SCNC: 130 MMOL/L (ref 135–145)
SODIUM SERPL-SCNC: 131 MMOL/L (ref 135–145)
SODIUM SERPL-SCNC: 132 MMOL/L (ref 135–145)
WBC # BLD AUTO: 4 K/UL (ref 4.8–10.8)

## 2023-09-11 PROCEDURE — 82140 ASSAY OF AMMONIA: CPT

## 2023-09-11 PROCEDURE — 51798 US URINE CAPACITY MEASURE: CPT

## 2023-09-11 PROCEDURE — 84295 ASSAY OF SERUM SODIUM: CPT | Mod: 91

## 2023-09-11 PROCEDURE — 80048 BASIC METABOLIC PNL TOTAL CA: CPT

## 2023-09-11 PROCEDURE — 85027 COMPLETE CBC AUTOMATED: CPT

## 2023-09-11 PROCEDURE — 700102 HCHG RX REV CODE 250 W/ 637 OVERRIDE(OP): Performed by: STUDENT IN AN ORGANIZED HEALTH CARE EDUCATION/TRAINING PROGRAM

## 2023-09-11 PROCEDURE — 99239 HOSP IP/OBS DSCHRG MGMT >30: CPT | Performed by: STUDENT IN AN ORGANIZED HEALTH CARE EDUCATION/TRAINING PROGRAM

## 2023-09-11 PROCEDURE — 700102 HCHG RX REV CODE 250 W/ 637 OVERRIDE(OP): Performed by: INTERNAL MEDICINE

## 2023-09-11 PROCEDURE — A9270 NON-COVERED ITEM OR SERVICE: HCPCS | Performed by: STUDENT IN AN ORGANIZED HEALTH CARE EDUCATION/TRAINING PROGRAM

## 2023-09-11 PROCEDURE — 36415 COLL VENOUS BLD VENIPUNCTURE: CPT

## 2023-09-11 PROCEDURE — A9270 NON-COVERED ITEM OR SERVICE: HCPCS | Performed by: INTERNAL MEDICINE

## 2023-09-11 RX ORDER — CEFDINIR 300 MG/1
300 CAPSULE ORAL 2 TIMES DAILY
Qty: 8 CAPSULE | Refills: 0 | Status: ACTIVE | OUTPATIENT
Start: 2023-09-11 | End: 2023-09-15

## 2023-09-11 RX ORDER — MIDODRINE HYDROCHLORIDE 5 MG/1
5 TABLET ORAL
Qty: 90 TABLET | Refills: 0 | Status: SHIPPED | OUTPATIENT
Start: 2023-09-11 | End: 2023-10-11

## 2023-09-11 RX ORDER — TAMSULOSIN HYDROCHLORIDE 0.4 MG/1
0.4 CAPSULE ORAL
Qty: 30 CAPSULE | Refills: 0 | Status: SHIPPED | OUTPATIENT
Start: 2023-09-12 | End: 2023-10-12

## 2023-09-11 RX ADMIN — TAMSULOSIN HYDROCHLORIDE 0.4 MG: 0.4 CAPSULE ORAL at 08:15

## 2023-09-11 RX ADMIN — MIDODRINE HYDROCHLORIDE 5 MG: 5 TABLET ORAL at 08:15

## 2023-09-11 ASSESSMENT — PAIN DESCRIPTION - PAIN TYPE: TYPE: ACUTE PAIN

## 2023-09-11 NOTE — DISCHARGE SUMMARY
Discharge Summary    CHIEF COMPLAINT ON ADMISSION  Chief Complaint   Patient presents with    Abdominal Pain     R sided pain since Wednesday and L sided since Friday.        Reason for Admission  Sent by       Admission Date  9/9/2023    CODE STATUS  Prior    HPI & HOSPITAL COURSE  Patti Cali is a 78 y.o. female with past medical history of alcoholic liver cirrhosis, alcohol dependence in remission, iron deficiency anemia, who was admitted 9/9/2023 for UTI, urinary retention and hyponatremia. CT of the abdomen and pelvis with contrast showed some cholelithiasis without any other abnormalities.  UA c/w UTI. Patient had endoscopy 2 days ago in [facility] for iron deficiency anemia that revealed angiodysplasia in the intestines, without active bleeding.  Her iron pills were increased.     Pyelonephritis -patient has been treated with the Rocephin, transition to cefdinir at discharge to complete a 5 days course.     Urinary retention-Fox was placed at admission.  Fox removed, voiding well.  She will continue Flomax.     Hypotension - BP low 87/41, her hypertension medication losartan, spironolactone and hydralazine were discontinued.  She started on midodrine with SBP 100s.  We will continue midodrine.  She was advised to monitor blood pressure at home and keep a blood pressure log.  Hold or discontinue midodrine if SBP > 95, resume losartan if SBP > 130 constantly.  She will follow-up with her primary care physician in 1 week     Acute on chronic hyponatremia -likely due to dehydration and urinary retention.  Resolved with supportive care.  Sodium 131 at discharge.  She was advised to follow-up with the primary care physician and recheck BMP in 3-5 days    Therefore, she is discharged in good and stable condition to home with close outpatient follow-up.    The patient met 2-midnight criteria for an inpatient stay at the time of discharge.    Discharge Date  9/11/2023    FOLLOW UP ITEMS POST  DISCHARGE  - Follow up with primary care physician in 1 week.   -Your blood pressure was low during this admission.  Your hypertension medication losartan and hydralazine were discontinued.  You are on new medication midodrine to bring up your blood pressure.  You can continue take home propranolol.  Please keep a blood pressure log at home.  Hold or discontinue midodrine if SBP > 95, resume losartan if SBP > 130 constantly.   -We started a new medication Flomax for urinary retention.  Hold it if you SBP < 95.  you may discontinue it if you have no more problems with urination.  Please follow-up with your primary care physician for further management  - Please take the medications as instructed    - Go to the local Emergency Department if you have any worsening condition.       DISCHARGE DIAGNOSES  Principal Problem:    Pyelonephritis (POA: Yes)  Active Problems:    Acute urinary retention (POA: Unknown)    Hyponatremia (POA: Unknown)    Hypotension (POA: Unknown)    History of cirrhosis of liver (POA: Unknown)    Iron deficiency anemia (POA: Unknown)    Elevated lipase (POA: Unknown)    ACP (advance care planning) (POA: Unknown)  Resolved Problems:    * No resolved hospital problems. *      FOLLOW UP  No future appointments.  No follow-up provider specified.    MEDICATIONS ON DISCHARGE     Medication List        START taking these medications        Instructions   cefdinir 300 MG Caps  Commonly known as: Omnicef   Take 1 Capsule by mouth 2 times a day for 4 days.  Dose: 300 mg     midodrine 5 MG Tabs  Commonly known as: Proamatine   Take 1 Tablet by mouth 3 times a day with meals for 30 days.  Dose: 5 mg     tamsulosin 0.4 MG capsule  Start taking on: September 12, 2023  Commonly known as: Flomax   Take 1 Capsule by mouth 1/2 hour after breakfast for 30 days.  Dose: 0.4 mg            CONTINUE taking these medications        Instructions   gabapentin 300 MG Caps  Commonly known as: Neurontin   Take 300 mg by mouth  at bedtime.  Dose: 300 mg     * ibuprofen 200 MG Tabs  Commonly known as: Motrin   Take 400 mg by mouth every 6 hours as needed for Mild Pain.  Dose: 400 mg     * ibuprofen 400 MG Tabs  Commonly known as: Motrin   TAKE 1 TABLET BY MOUTH EVERY 6 HOURS AS NEEDED FOR PAIN OR INFLAMMATION     propranolol 10 MG Tabs  Commonly known as: Inderal   Take 10 mg by mouth 3 times a day.  Dose: 10 mg           * This list has 2 medication(s) that are the same as other medications prescribed for you. Read the directions carefully, and ask your doctor or other care provider to review them with you.                STOP taking these medications      hydrALAZINE 25 MG Tabs  Commonly known as: Apresoline     losartan 100 MG Tabs  Commonly known as: Cozaar     spironolactone 50 MG Tabs  Commonly known as: Aldactone              Allergies  Allergies   Allergen Reactions    Latex     Denture Adhesive Rash       DIET  No orders of the defined types were placed in this encounter.      ACTIVITY  As tolerated.  Weight bearing as tolerated    CONSULTATIONS      PROCEDURES      LABORATORY  Lab Results   Component Value Date    SODIUM 131 (L) 09/11/2023    POTASSIUM 4.5 09/11/2023    CHLORIDE 99 09/11/2023    CO2 23 09/11/2023    GLUCOSE 101 (H) 09/11/2023    BUN 8 09/11/2023    CREATININE 0.69 09/11/2023        Lab Results   Component Value Date    WBC 4.0 (L) 09/11/2023    HEMOGLOBIN 9.2 (L) 09/11/2023    HEMATOCRIT 27.7 (L) 09/11/2023    PLATELETCT 165 09/11/2023        Total time of the discharge process exceeds 32 minutes.

## 2023-09-11 NOTE — DISCHARGE INSTRUCTIONS
- Follow up with primary care physician in 1 week.     -Your blood pressure was low during this admission.  Your hypertension medication losartan and hydralazine were discontinued.  You are on new medication midodrine to bring up your blood pressure.  You can continue take home propranolol.  Please keep a blood pressure log at home.  Hold or discontinue midodrine if SBP > 95, resume losartan if SBP > 130 constantly.   -We started a new medication Flomax for urinary retention.  Hold it if you SBP < 95.  you may discontinue it if you have no more problems with urination.  Please follow-up with your primary care physician for further management    - Please take the medications as instructed

## 2023-09-11 NOTE — CARE PLAN
Problem: Pain - Standard  Goal: Alleviation of pain or a reduction in pain to the patient’s comfort goal  Description: Target End Date:  Prior to discharge or change in level of care    Document on Vitals flowsheet    1.  Document pain using the appropriate pain scale per order or unit policy  2.  Educate and implement non-pharmacologic comfort measures (i.e. relaxation, distraction, massage, cold/heat therapy, etc.)  3.  Pain management medications as ordered  4.  Reassess pain after pain med administration per policy  5.  If opiods administered assess patient's response to pain medication is appropriate per POSS sedation scale  6.  Follow pain management plan developed in collaboration with patient and interdisciplinary team (including palliative care or pain specialists if applicable)  Outcome: Not Progressing  Note: Pt is A&Ox4,c/o lower abdominal pain 7/10. PRN Oxy 5 mg administered. Fox cath out, pt voided x1. Abdomen is soft, non-tender; bowel sounds active x4 qdrs. Ambulatory w/SBA. Educated on fall precaution, verbalized understanding.     Problem: Knowledge Deficit - Standard  Goal: Patient and family/care givers will demonstrate understanding of plan of care, disease process/condition, diagnostic tests and medications  Description: Target End Date:  1-3 days or as soon as patient condition allows    Document in Patient Education    1.  Patient and family/caregiver oriented to unit, equipment, visitation policy and means for communicating concern  2.  Complete/review Learning Assessment  3.  Assess knowledge level of disease process/condition, treatment plan, diagnostic tests and medications  4.  Explain disease process/condition, treatment plan, diagnostic tests and medications  Outcome: Not Progressing   The patient is Stable - Low risk of patient condition declining or worsening    Shift Goals  Clinical Goals: Monitor I&Os, IVF/ABX  Patient Goals: Rest    Progress made toward(s) clinical / shift  goals:  Able to void.    Patient is not progressing towards the following goals:      Problem: Pain - Standard  Goal: Alleviation of pain or a reduction in pain to the patient’s comfort goal  Description: Target End Date:  Prior to discharge or change in level of care    Document on Vitals flowsheet    1.  Document pain using the appropriate pain scale per order or unit policy  2.  Educate and implement non-pharmacologic comfort measures (i.e. relaxation, distraction, massage, cold/heat therapy, etc.)  3.  Pain management medications as ordered  4.  Reassess pain after pain med administration per policy  5.  If opiods administered assess patient's response to pain medication is appropriate per POSS sedation scale  6.  Follow pain management plan developed in collaboration with patient and interdisciplinary team (including palliative care or pain specialists if applicable)  Outcome: Not Progressing  Note: Pt is A&Ox4,c/o lower abdominal pain 7/10. PRN Oxy 5 mg administered. Fox cath out, pt voided x1. Abdomen is soft, non-tender; bowel sounds active x4 qdrs. Ambulatory w/SBA. Educated on fall precaution, verbalized understanding.     Problem: Knowledge Deficit - Standard  Goal: Patient and family/care givers will demonstrate understanding of plan of care, disease process/condition, diagnostic tests and medications  Description: Target End Date:  1-3 days or as soon as patient condition allows    Document in Patient Education    1.  Patient and family/caregiver oriented to unit, equipment, visitation policy and means for communicating concern  2.  Complete/review Learning Assessment  3.  Assess knowledge level of disease process/condition, treatment plan, diagnostic tests and medications  4.  Explain disease process/condition, treatment plan, diagnostic tests and medications  Outcome: Not Progressing

## 2023-09-11 NOTE — CARE PLAN
Received, A&Ox4 oniel pleasant. Ambulating to the restroom with adequate urine output during the shift, denies pain. Able to rest and sleep during the shift.    The patient is Stable - Low risk of patient condition declining or worsening    Shift Goals  Clinical Goals: Will have adequate urine output, IV antibiotics  Patient Goals: rest and sleep    Progress made toward(s) clinical / shift goals:      Problem: Urinary Elimination  Goal: Establish and maintain regular urinary output  Outcome: Progressing     Problem: Infection - Standard  Goal: Patient will remain free from infection  Outcome: Progressing     Problem: Pain - Standard  Goal: Alleviation of pain or a reduction in pain to the patient’s comfort goal  Outcome: Progressing       Patient is not progressing towards the following goals:

## 2023-09-12 LAB
BACTERIA UR CULT: NORMAL
SIGNIFICANT IND 70042: NORMAL
SITE SITE: NORMAL
SOURCE SOURCE: NORMAL